# Patient Record
Sex: FEMALE | Race: WHITE | NOT HISPANIC OR LATINO | Employment: OTHER | URBAN - NONMETROPOLITAN AREA
[De-identification: names, ages, dates, MRNs, and addresses within clinical notes are randomized per-mention and may not be internally consistent; named-entity substitution may affect disease eponyms.]

---

## 2023-02-23 PROBLEM — N39.0 RECURRENT UTI: Status: ACTIVE | Noted: 2023-02-23

## 2023-02-23 PROBLEM — M25.512 CHRONIC LEFT SHOULDER PAIN: Status: ACTIVE | Noted: 2023-02-23

## 2023-02-23 PROBLEM — N63.15 BREAST LUMP ON RIGHT SIDE AT 6 O'CLOCK POSITION: Status: ACTIVE | Noted: 2023-02-23

## 2023-02-23 PROBLEM — G89.29 CHRONIC LEFT SHOULDER PAIN: Status: ACTIVE | Noted: 2023-02-23

## 2023-02-23 PROBLEM — M25.612 STIFFNESS OF LEFT SHOULDER, NOT ELSEWHERE CLASSIFIED: Status: ACTIVE | Noted: 2023-02-23

## 2023-02-23 PROBLEM — E55.9 VITAMIN D DEFICIENCY: Status: ACTIVE | Noted: 2023-02-23

## 2023-02-23 PROBLEM — R92.8 ABNORMAL MAMMOGRAM: Status: ACTIVE | Noted: 2023-02-23

## 2023-02-23 PROBLEM — B37.31 CANDIDIASIS, VAGINA: Status: ACTIVE | Noted: 2023-02-23

## 2023-02-23 PROBLEM — R26.89 BALANCE PROBLEM: Status: ACTIVE | Noted: 2023-02-23

## 2023-02-23 PROBLEM — R35.1 NOCTURIA: Status: ACTIVE | Noted: 2023-02-23

## 2023-02-23 PROBLEM — R53.83 FATIGUE: Status: ACTIVE | Noted: 2023-02-23

## 2023-02-23 PROBLEM — S42.202A CLOSED FRACTURE OF LEFT PROXIMAL HUMERUS: Status: ACTIVE | Noted: 2023-02-23

## 2023-02-23 PROBLEM — M81.0 OSTEOPOROSIS: Status: ACTIVE | Noted: 2023-02-23

## 2023-02-23 PROBLEM — B35.6 TINEA CRURIS: Status: ACTIVE | Noted: 2023-02-23

## 2023-02-23 PROBLEM — C50.911 BREAST CANCER, RIGHT BREAST (MULTI): Status: ACTIVE | Noted: 2023-02-23

## 2023-02-23 PROBLEM — B37.2 CANDIDIASIS, CUTANEOUS: Status: ACTIVE | Noted: 2023-02-23

## 2023-02-23 PROBLEM — S49.109G: Status: ACTIVE | Noted: 2023-02-23

## 2023-02-23 PROBLEM — R79.89 LOW VITAMIN B12 LEVEL: Status: ACTIVE | Noted: 2023-02-23

## 2023-02-23 RX ORDER — DOCUSATE SODIUM 100 MG/1
100 CAPSULE, LIQUID FILLED ORAL 2 TIMES DAILY
COMMUNITY
Start: 2021-06-23

## 2023-02-23 RX ORDER — NYSTATIN 100000 [USP'U]/G
POWDER TOPICAL
COMMUNITY
Start: 2021-05-03 | End: 2023-10-02 | Stop reason: WASHOUT

## 2023-02-23 RX ORDER — LANOLIN ALCOHOL/MO/W.PET/CERES
2 CREAM (GRAM) TOPICAL
COMMUNITY
End: 2024-04-15 | Stop reason: SDUPTHER

## 2023-02-23 RX ORDER — ACETAMINOPHEN 325 MG/1
1-2 TABLET ORAL EVERY 4 HOURS PRN
COMMUNITY
Start: 2021-06-23

## 2023-02-23 RX ORDER — CLOTRIMAZOLE AND BETAMETHASONE DIPROPIONATE 10; .64 MG/G; MG/G
1 CREAM TOPICAL 2 TIMES DAILY
COMMUNITY
Start: 2022-11-22

## 2023-02-23 RX ORDER — VITAMIN E (DL,TOCOPHERYL ACET) 180 MG
CAPSULE ORAL
COMMUNITY
Start: 2021-06-23

## 2023-02-23 RX ORDER — ADHESIVE BANDAGE
BANDAGE TOPICAL
COMMUNITY
Start: 2021-06-23

## 2023-02-23 RX ORDER — MULTIVITAMIN
1 TABLET ORAL
COMMUNITY
Start: 2021-06-23

## 2023-02-23 RX ORDER — OXYCODONE AND ACETAMINOPHEN 5; 325 MG/1; MG/1
1 TABLET ORAL EVERY 6 HOURS PRN
COMMUNITY
Start: 2022-02-07 | End: 2023-03-27 | Stop reason: ALTCHOICE

## 2023-02-23 RX ORDER — NYSTATIN 100000 U/G
CREAM TOPICAL
COMMUNITY
Start: 2021-06-23

## 2023-02-23 RX ORDER — CALCIUM CARBONATE/VITAMIN D3 600 MG-20
TABLET,CHEWABLE ORAL
COMMUNITY
Start: 2021-06-23

## 2023-02-23 RX ORDER — ASPIRIN 81 MG/1
1 TABLET ORAL
COMMUNITY
Start: 2021-06-23

## 2023-03-27 ENCOUNTER — OFFICE VISIT (OUTPATIENT)
Dept: PRIMARY CARE | Facility: CLINIC | Age: 83
End: 2023-03-27
Payer: MEDICARE

## 2023-03-27 ENCOUNTER — LAB (OUTPATIENT)
Dept: LAB | Facility: LAB | Age: 83
End: 2023-03-27
Payer: MEDICARE

## 2023-03-27 VITALS
BODY MASS INDEX: 24.74 KG/M2 | OXYGEN SATURATION: 96 % | SYSTOLIC BLOOD PRESSURE: 130 MMHG | DIASTOLIC BLOOD PRESSURE: 80 MMHG | HEART RATE: 101 BPM | HEIGHT: 59 IN | WEIGHT: 122.7 LBS

## 2023-03-27 DIAGNOSIS — R79.89 LOW VITAMIN B12 LEVEL: ICD-10-CM

## 2023-03-27 DIAGNOSIS — R53.83 FATIGUE, UNSPECIFIED TYPE: ICD-10-CM

## 2023-03-27 DIAGNOSIS — Z17.0 MALIGNANT NEOPLASM OF NIPPLE OF RIGHT BREAST IN FEMALE, ESTROGEN RECEPTOR POSITIVE (MULTI): ICD-10-CM

## 2023-03-27 DIAGNOSIS — Z00.00 ROUTINE GENERAL MEDICAL EXAMINATION AT HEALTH CARE FACILITY: Primary | ICD-10-CM

## 2023-03-27 DIAGNOSIS — M81.0 OSTEOPOROSIS WITHOUT CURRENT PATHOLOGICAL FRACTURE, UNSPECIFIED OSTEOPOROSIS TYPE: ICD-10-CM

## 2023-03-27 DIAGNOSIS — C50.011 MALIGNANT NEOPLASM OF NIPPLE OF RIGHT BREAST IN FEMALE, ESTROGEN RECEPTOR POSITIVE (MULTI): ICD-10-CM

## 2023-03-27 LAB
ALANINE AMINOTRANSFERASE (SGPT) (U/L) IN SER/PLAS: 14 U/L (ref 7–45)
ALBUMIN (G/DL) IN SER/PLAS: 4.7 G/DL (ref 3.4–5)
ALKALINE PHOSPHATASE (U/L) IN SER/PLAS: 61 U/L (ref 33–136)
ANION GAP IN SER/PLAS: 14 MMOL/L (ref 10–20)
ASPARTATE AMINOTRANSFERASE (SGOT) (U/L) IN SER/PLAS: 22 U/L (ref 9–39)
BILIRUBIN TOTAL (MG/DL) IN SER/PLAS: 0.5 MG/DL (ref 0–1.2)
CALCIUM (MG/DL) IN SER/PLAS: 10 MG/DL (ref 8.6–10.3)
CARBON DIOXIDE, TOTAL (MMOL/L) IN SER/PLAS: 29 MMOL/L (ref 21–32)
CHLORIDE (MMOL/L) IN SER/PLAS: 101 MMOL/L (ref 98–107)
COBALAMIN (VITAMIN B12) (PG/ML) IN SER/PLAS: 1584 PG/ML (ref 211–911)
CREATININE (MG/DL) IN SER/PLAS: 0.96 MG/DL (ref 0.5–1.05)
ERYTHROCYTE DISTRIBUTION WIDTH (RATIO) BY AUTOMATED COUNT: 13 % (ref 11.5–14.5)
ERYTHROCYTE MEAN CORPUSCULAR HEMOGLOBIN CONCENTRATION (G/DL) BY AUTOMATED: 33.6 G/DL (ref 32–36)
ERYTHROCYTE MEAN CORPUSCULAR VOLUME (FL) BY AUTOMATED COUNT: 100 FL (ref 80–100)
ERYTHROCYTES (10*6/UL) IN BLOOD BY AUTOMATED COUNT: 4.29 X10E12/L (ref 4–5.2)
GFR FEMALE: 58 ML/MIN/1.73M2
GLUCOSE (MG/DL) IN SER/PLAS: 81 MG/DL (ref 74–99)
HEMATOCRIT (%) IN BLOOD BY AUTOMATED COUNT: 42.8 % (ref 36–46)
HEMOGLOBIN (G/DL) IN BLOOD: 14.4 G/DL (ref 12–16)
LEUKOCYTES (10*3/UL) IN BLOOD BY AUTOMATED COUNT: 9.2 X10E9/L (ref 4.4–11.3)
PLATELETS (10*3/UL) IN BLOOD AUTOMATED COUNT: 281 X10E9/L (ref 150–450)
POTASSIUM (MMOL/L) IN SER/PLAS: 4.3 MMOL/L (ref 3.5–5.3)
PROTEIN TOTAL: 7.5 G/DL (ref 6.4–8.2)
SODIUM (MMOL/L) IN SER/PLAS: 140 MMOL/L (ref 136–145)
UREA NITROGEN (MG/DL) IN SER/PLAS: 23 MG/DL (ref 6–23)

## 2023-03-27 PROCEDURE — 85027 COMPLETE CBC AUTOMATED: CPT

## 2023-03-27 PROCEDURE — 36415 COLL VENOUS BLD VENIPUNCTURE: CPT

## 2023-03-27 PROCEDURE — 99213 OFFICE O/P EST LOW 20 MIN: CPT | Performed by: FAMILY MEDICINE

## 2023-03-27 PROCEDURE — 82607 VITAMIN B-12: CPT

## 2023-03-27 PROCEDURE — 1160F RVW MEDS BY RX/DR IN RCRD: CPT | Performed by: FAMILY MEDICINE

## 2023-03-27 PROCEDURE — 1036F TOBACCO NON-USER: CPT | Performed by: FAMILY MEDICINE

## 2023-03-27 PROCEDURE — 1159F MED LIST DOCD IN RCRD: CPT | Performed by: FAMILY MEDICINE

## 2023-03-27 PROCEDURE — 80053 COMPREHEN METABOLIC PANEL: CPT

## 2023-03-27 PROCEDURE — 1157F ADVNC CARE PLAN IN RCRD: CPT | Performed by: FAMILY MEDICINE

## 2023-03-27 PROCEDURE — 1170F FXNL STATUS ASSESSED: CPT | Performed by: FAMILY MEDICINE

## 2023-03-27 PROCEDURE — G0439 PPPS, SUBSEQ VISIT: HCPCS | Performed by: FAMILY MEDICINE

## 2023-03-27 RX ORDER — PUMPKIN SEED EXTRACT/SOY GERM 300 MG
2 CAPSULE ORAL DAILY
COMMUNITY

## 2023-03-27 RX ORDER — POLYETHYLENE GLYCOL 400 AND PROPYLENE GLYCOL 4; 3 MG/ML; MG/ML
1 GEL OPHTHALMIC DAILY
COMMUNITY

## 2023-03-27 RX ORDER — IBUPROFEN 200 MG
600 TABLET ORAL EVERY 8 HOURS PRN
COMMUNITY

## 2023-03-27 RX ORDER — OLOPATADINE HYDROCHLORIDE 1 MG/ML
1 SOLUTION/ DROPS OPHTHALMIC DAILY
COMMUNITY

## 2023-03-27 ASSESSMENT — PATIENT HEALTH QUESTIONNAIRE - PHQ9
1. LITTLE INTEREST OR PLEASURE IN DOING THINGS: NOT AT ALL
SUM OF ALL RESPONSES TO PHQ9 QUESTIONS 1 AND 2: 0
2. FEELING DOWN, DEPRESSED OR HOPELESS: NOT AT ALL

## 2023-03-27 ASSESSMENT — ENCOUNTER SYMPTOMS
APPETITE CHANGE: 0
PALPITATIONS: 0
DIARRHEA: 0
NAUSEA: 0
VOMITING: 0
WHEEZING: 0
ABDOMINAL DISTENTION: 0
UNEXPECTED WEIGHT CHANGE: 0
NUMBNESS: 0
RHINORRHEA: 0
LOSS OF SENSATION IN FEET: 0
ADENOPATHY: 0
COUGH: 0
DIZZINESS: 0
FATIGUE: 0
TROUBLE SWALLOWING: 0
LIGHT-HEADEDNESS: 0
OCCASIONAL FEELINGS OF UNSTEADINESS: 1
DEPRESSION: 0
HEADACHES: 0
SHORTNESS OF BREATH: 0
CONSTIPATION: 0
DIFFICULTY URINATING: 0
ACTIVITY CHANGE: 0
ABDOMINAL PAIN: 0
ARTHRALGIAS: 0

## 2023-03-27 ASSESSMENT — ACTIVITIES OF DAILY LIVING (ADL)
DOING_HOUSEWORK: NEEDS ASSISTANCE
DRESSING: NEEDS ASSISTANCE
GROCERY_SHOPPING: NEEDS ASSISTANCE
MANAGING_FINANCES: NEEDS ASSISTANCE
BATHING: NEEDS ASSISTANCE
TAKING_MEDICATION: NEEDS ASSISTANCE

## 2023-03-27 NOTE — ASSESSMENT & PLAN NOTE
We will check blood testing today, patient is a resident of an assisted living facility, uses a wheeled walker to get around, needs some help with activities of daily living but otherwise seems to be doing very well and adapting well

## 2023-03-27 NOTE — PROGRESS NOTES
"Subjective   Reason for Visit: Fide Dodson is an 82 y.o. female here for a Medicare Wellness visit.     Past Medical, Surgical, and Family History reviewed and updated in chart.    Reviewed all medications by prescribing practitioner or clinical pharmacist (such as prescriptions, OTCs, herbal therapies and supplements) and documented in the medical record.    HPI  Seen surgeon in [place] for breast cancer a year ago, sees oncology, no medicines  No regular Rx  Medicines  Having some dental issues, has appointment in May  Uses occ APAP if needed for pain  Uses a walker to get around, has had some falls at assisted living      Patient Care Team:  Maxx Motta MD as PCP - General (Family Medicine)  René Mckay MD as PCP - O Medicare Advantage PCP     Review of Systems   Constitutional:  Negative for activity change, appetite change, fatigue and unexpected weight change.   HENT:  Negative for ear pain, nosebleeds, rhinorrhea, sneezing and trouble swallowing.    Respiratory:  Negative for cough, shortness of breath and wheezing.    Cardiovascular:  Negative for chest pain, palpitations and leg swelling.   Gastrointestinal:  Negative for abdominal distention, abdominal pain, constipation, diarrhea, nausea and vomiting.   Genitourinary:  Negative for difficulty urinating.   Musculoskeletal:  Negative for arthralgias.   Skin:  Negative for rash.   Neurological:  Negative for dizziness, light-headedness, numbness and headaches.   Hematological:  Negative for adenopathy.   Psychiatric/Behavioral:  Negative for behavioral problems.    All other systems reviewed and are negative.      Objective   Vitals:  /80   Pulse 101   Ht 1.499 m (4' 11\")   Wt 55.7 kg (122 lb 11.2 oz)   SpO2 96%   BMI 24.78 kg/m²       Physical Exam  Vitals and nursing note reviewed.   Constitutional:       Appearance: Normal appearance.   Cardiovascular:      Rate and Rhythm: Normal rate and regular rhythm.      Pulses: " Normal pulses.      Heart sounds: Normal heart sounds.   Pulmonary:      Effort: Pulmonary effort is normal.      Breath sounds: Normal breath sounds.   Neurological:      Mental Status: She is alert.   Psychiatric:         Mood and Affect: Mood normal.         Behavior: Behavior normal.         Assessment/Plan   Problem List Items Addressed This Visit          Musculoskeletal    Osteoporosis       Hematologic    Low vitamin B12 level    Current Assessment & Plan     Check blood testing today.            Other    Breast cancer, right breast (CMS/HCC)    Current Assessment & Plan     Patient follows with oncology at [facility], is currently on no medications, had surgery over a year ago.         Fatigue    Current Assessment & Plan     We will check blood testing today, patient is a resident of an assisted living facility, uses a wheeled walker to get around, needs some help with activities of daily living but otherwise seems to be doing very well and adapting well          Other Visit Diagnoses       Routine general medical examination at health care facility    -  Primary

## 2023-03-27 NOTE — ASSESSMENT & PLAN NOTE
Patient follows with oncology at [facility], is currently on no medications, had surgery over a year ago.

## 2023-04-12 ENCOUNTER — APPOINTMENT (OUTPATIENT)
Dept: PRIMARY CARE | Facility: CLINIC | Age: 83
End: 2023-04-12
Payer: MEDICARE

## 2023-10-02 ENCOUNTER — LAB (OUTPATIENT)
Dept: LAB | Facility: LAB | Age: 83
End: 2023-10-02
Payer: MEDICARE

## 2023-10-02 ENCOUNTER — OFFICE VISIT (OUTPATIENT)
Dept: PRIMARY CARE | Facility: CLINIC | Age: 83
End: 2023-10-02
Payer: MEDICARE

## 2023-10-02 VITALS
HEART RATE: 67 BPM | SYSTOLIC BLOOD PRESSURE: 100 MMHG | DIASTOLIC BLOOD PRESSURE: 62 MMHG | WEIGHT: 117.8 LBS | BODY MASS INDEX: 23.75 KG/M2 | OXYGEN SATURATION: 95 % | HEIGHT: 59 IN

## 2023-10-02 DIAGNOSIS — E55.9 VITAMIN D DEFICIENCY: ICD-10-CM

## 2023-10-02 DIAGNOSIS — Z17.0 MALIGNANT NEOPLASM OF NIPPLE OF RIGHT BREAST IN FEMALE, ESTROGEN RECEPTOR POSITIVE (MULTI): ICD-10-CM

## 2023-10-02 DIAGNOSIS — R79.89 LOW VITAMIN B12 LEVEL: ICD-10-CM

## 2023-10-02 DIAGNOSIS — R26.89 BALANCE PROBLEM: ICD-10-CM

## 2023-10-02 DIAGNOSIS — R53.83 FATIGUE, UNSPECIFIED TYPE: ICD-10-CM

## 2023-10-02 DIAGNOSIS — M81.0 OSTEOPOROSIS WITHOUT CURRENT PATHOLOGICAL FRACTURE, UNSPECIFIED OSTEOPOROSIS TYPE: ICD-10-CM

## 2023-10-02 DIAGNOSIS — Z78.0 MENOPAUSE: ICD-10-CM

## 2023-10-02 DIAGNOSIS — R53.83 FATIGUE, UNSPECIFIED TYPE: Primary | ICD-10-CM

## 2023-10-02 DIAGNOSIS — C50.011 MALIGNANT NEOPLASM OF NIPPLE OF RIGHT BREAST IN FEMALE, ESTROGEN RECEPTOR POSITIVE (MULTI): ICD-10-CM

## 2023-10-02 PROBLEM — B37.31 CANDIDIASIS, VAGINA: Status: RESOLVED | Noted: 2023-02-23 | Resolved: 2023-10-02

## 2023-10-02 PROBLEM — B37.2 CANDIDIASIS, CUTANEOUS: Status: RESOLVED | Noted: 2023-02-23 | Resolved: 2023-10-02

## 2023-10-02 PROBLEM — R35.1 NOCTURIA: Status: RESOLVED | Noted: 2023-02-23 | Resolved: 2023-10-02

## 2023-10-02 PROBLEM — B35.6 TINEA CRURIS: Status: RESOLVED | Noted: 2023-02-23 | Resolved: 2023-10-02

## 2023-10-02 PROBLEM — N63.15 BREAST LUMP ON RIGHT SIDE AT 6 O'CLOCK POSITION: Status: RESOLVED | Noted: 2023-02-23 | Resolved: 2023-10-02

## 2023-10-02 PROBLEM — R92.8 ABNORMAL MAMMOGRAM: Status: RESOLVED | Noted: 2023-02-23 | Resolved: 2023-10-02

## 2023-10-02 LAB
25(OH)D3 SERPL-MCNC: 63 NG/ML (ref 30–100)
ALBUMIN SERPL BCP-MCNC: 4.2 G/DL (ref 3.4–5)
ALP SERPL-CCNC: 75 U/L (ref 33–136)
ALT SERPL W P-5'-P-CCNC: 14 U/L (ref 7–45)
ANION GAP SERPL CALC-SCNC: 12 MMOL/L (ref 10–20)
AST SERPL W P-5'-P-CCNC: 17 U/L (ref 9–39)
BASOPHILS # BLD AUTO: 0.06 X10*3/UL (ref 0–0.1)
BASOPHILS NFR BLD AUTO: 0.7 %
BILIRUB SERPL-MCNC: 0.5 MG/DL (ref 0–1.2)
BUN SERPL-MCNC: 10 MG/DL (ref 6–23)
CALCIUM SERPL-MCNC: 9.9 MG/DL (ref 8.6–10.3)
CHLORIDE SERPL-SCNC: 102 MMOL/L (ref 98–107)
CO2 SERPL-SCNC: 33 MMOL/L (ref 21–32)
CREAT SERPL-MCNC: 0.88 MG/DL (ref 0.5–1.05)
EOSINOPHIL # BLD AUTO: 0.48 X10*3/UL (ref 0–0.4)
EOSINOPHIL NFR BLD AUTO: 5.8 %
ERYTHROCYTE [DISTWIDTH] IN BLOOD BY AUTOMATED COUNT: 12.8 % (ref 11.5–14.5)
GFR SERPL CREATININE-BSD FRML MDRD: 65 ML/MIN/1.73M*2
GLUCOSE SERPL-MCNC: 110 MG/DL (ref 74–99)
HCT VFR BLD AUTO: 41.1 % (ref 36–46)
HGB BLD-MCNC: 13.3 G/DL (ref 12–16)
IMM GRANULOCYTES # BLD AUTO: 0.05 X10*3/UL (ref 0–0.5)
IMM GRANULOCYTES NFR BLD AUTO: 0.6 % (ref 0–0.9)
LYMPHOCYTES # BLD AUTO: 0.96 X10*3/UL (ref 0.8–3)
LYMPHOCYTES NFR BLD AUTO: 11.6 %
MCH RBC QN AUTO: 33.1 PG (ref 26–34)
MCHC RBC AUTO-ENTMCNC: 32.4 G/DL (ref 32–36)
MCV RBC AUTO: 102 FL (ref 80–100)
MONOCYTES # BLD AUTO: 0.74 X10*3/UL (ref 0.05–0.8)
MONOCYTES NFR BLD AUTO: 8.9 %
NEUTROPHILS # BLD AUTO: 5.98 X10*3/UL (ref 1.6–5.5)
NEUTROPHILS NFR BLD AUTO: 72.4 %
NRBC BLD-RTO: 0 /100 WBCS (ref 0–0)
PLATELET # BLD AUTO: 334 X10*3/UL (ref 150–450)
PMV BLD AUTO: 9.3 FL (ref 7.5–11.5)
POTASSIUM SERPL-SCNC: 3.7 MMOL/L (ref 3.5–5.3)
PROT SERPL-MCNC: 6.8 G/DL (ref 6.4–8.2)
RBC # BLD AUTO: 4.02 X10*6/UL (ref 4–5.2)
SODIUM SERPL-SCNC: 143 MMOL/L (ref 136–145)
TSH SERPL-ACNC: 0.79 MIU/L (ref 0.44–3.98)
VIT B12 SERPL-MCNC: 2432 PG/ML (ref 211–911)
WBC # BLD AUTO: 8.3 X10*3/UL (ref 4.4–11.3)

## 2023-10-02 PROCEDURE — 1160F RVW MEDS BY RX/DR IN RCRD: CPT | Performed by: FAMILY MEDICINE

## 2023-10-02 PROCEDURE — 36415 COLL VENOUS BLD VENIPUNCTURE: CPT

## 2023-10-02 PROCEDURE — 99214 OFFICE O/P EST MOD 30 MIN: CPT | Performed by: FAMILY MEDICINE

## 2023-10-02 PROCEDURE — 1159F MED LIST DOCD IN RCRD: CPT | Performed by: FAMILY MEDICINE

## 2023-10-02 PROCEDURE — 1036F TOBACCO NON-USER: CPT | Performed by: FAMILY MEDICINE

## 2023-10-02 RX ORDER — CHLORHEXIDINE GLUCONATE ORAL RINSE 1.2 MG/ML
15 SOLUTION DENTAL 2 TIMES DAILY
COMMUNITY

## 2023-10-02 RX ORDER — 1.1% SODIUM FLUORIDE PRESCRIPTION DENTAL CREAM 5 MG/G
CREAM DENTAL DAILY
COMMUNITY
Start: 2023-09-11

## 2023-10-02 ASSESSMENT — ENCOUNTER SYMPTOMS
SLEEP DISTURBANCE: 0
PALPITATIONS: 0
FATIGUE: 1
CONSTIPATION: 0
RHINORRHEA: 0
BACK PAIN: 1
DYSPHORIC MOOD: 0
COUGH: 0
LIGHT-HEADEDNESS: 0
SHORTNESS OF BREATH: 0
VOMITING: 0
ABDOMINAL DISTENTION: 0
NAUSEA: 0
ADENOPATHY: 0
NUMBNESS: 0
APPETITE CHANGE: 0
NERVOUS/ANXIOUS: 0
DIARRHEA: 0
ARTHRALGIAS: 0
TROUBLE SWALLOWING: 0
UNEXPECTED WEIGHT CHANGE: 0
WHEEZING: 0
HEADACHES: 0
DIFFICULTY URINATING: 0
ACTIVITY CHANGE: 0
ABDOMINAL PAIN: 0
DECREASED CONCENTRATION: 0
DIZZINESS: 0

## 2023-10-02 NOTE — PROGRESS NOTES
"Subjective   Patient ID: Fide Dodson is a 83 y.o. female who presents for 6 MO F/U.    HPI   No headache, chest pain, shortness of breath, dizziness, lightheadedness, or edema  Seen surgery in September and had MMG done  Treated for recent UTI, nitrofurantoin upset stomach, some appetite issues, had some diarrhea  Some PT due to weakness, using a walker to get around, no falls in the past 6 months      Review of Systems   Constitutional:  Positive for fatigue. Negative for activity change, appetite change and unexpected weight change.   HENT:  Negative for ear pain, nosebleeds, rhinorrhea, sneezing and trouble swallowing.    Respiratory:  Negative for cough, shortness of breath and wheezing.    Cardiovascular:  Negative for chest pain, palpitations and leg swelling.   Gastrointestinal:  Negative for abdominal distention, abdominal pain, constipation, diarrhea, nausea and vomiting.   Genitourinary:  Negative for difficulty urinating.   Musculoskeletal:  Positive for back pain and gait problem. Negative for arthralgias.   Skin:  Negative for rash.   Neurological:  Negative for dizziness, light-headedness, numbness and headaches.   Hematological:  Negative for adenopathy.   Psychiatric/Behavioral:  Negative for behavioral problems, decreased concentration, dysphoric mood and sleep disturbance. The patient is not nervous/anxious.    All other systems reviewed and are negative.      Objective   /62   Pulse 67   Ht 1.499 m (4' 11\")   Wt 53.4 kg (117 lb 12.8 oz)   SpO2 95%   BMI 23.79 kg/m²     Physical Exam  Vitals and nursing note reviewed.   Constitutional:       Appearance: Normal appearance.   HENT:      Head: Normocephalic and atraumatic.      Right Ear: Tympanic membrane, ear canal and external ear normal.      Left Ear: Tympanic membrane, ear canal and external ear normal.      Nose: Nose normal.      Mouth/Throat:      Mouth: Mucous membranes are moist.      Pharynx: Oropharynx is clear. "   Cardiovascular:      Rate and Rhythm: Normal rate and regular rhythm.      Pulses: Normal pulses.      Heart sounds: Normal heart sounds.   Pulmonary:      Effort: Pulmonary effort is normal.      Breath sounds: Normal breath sounds.   Musculoskeletal:      Cervical back: Normal range of motion and neck supple.   Neurological:      Mental Status: She is alert.   Psychiatric:         Mood and Affect: Mood normal.         Behavior: Behavior normal.         Assessment/Plan   Problem List Items Addressed This Visit             ICD-10-CM    Balance problem R26.89     Uses a walker, tolerating physical therapy at facility.         Relevant Orders    Follow Up In Primary Care - Established    Breast cancer, right breast (CMS/[facility]) C50.911     Recently seen in surgery, had mammogram done that was negative, patient had deferred hormone deprivation therapy, no change with current treatment plan.         Relevant Orders    Follow Up In Primary Care - Established    Fatigue - Primary R53.83    Relevant Orders    CBC and Auto Differential    Follow Up In Primary Care - Established    Comprehensive Metabolic Panel    Vitamin B12    Thyroid Stimulating Hormone    Low vitamin B12 level R79.89     Labs today, continue with oral supplementation.         Relevant Orders    CBC and Auto Differential    Follow Up In Primary Care - Established    Osteoporosis M81.0     Continue calcium and vitamin D, check vitamin D level today, check bone density test given recent fracture, last bone density test was over 2 years ago.         Relevant Orders    Follow Up In Primary Care - Established    Vitamin D 25-Hydroxy,Total (for eval of Vitamin D levels)    XR DEXA bone density    Vitamin D deficiency E55.9     Continue with supplementation, check levels today.         Relevant Orders    Follow Up In Primary Care - Established    Vitamin D 25-Hydroxy,Total (for eval of Vitamin D levels)    XR DEXA bone density     Other Visit Diagnoses          Codes    Menopause     Z78.0    Relevant Orders    XR DEXA bone density             Patient was identified as a fall risk. Risk prevention instructions provided.

## 2023-10-02 NOTE — ASSESSMENT & PLAN NOTE
Recently seen in surgery, had mammogram done that was negative, patient had deferred hormone deprivation therapy, no change with current treatment plan.

## 2023-10-02 NOTE — ASSESSMENT & PLAN NOTE
Continue calcium and vitamin D, check vitamin D level today, check bone density test given recent fracture, last bone density test was over 2 years ago.

## 2023-10-10 ENCOUNTER — ANCILLARY PROCEDURE (OUTPATIENT)
Dept: RADIOLOGY | Facility: CLINIC | Age: 83
End: 2023-10-10
Payer: MEDICARE

## 2023-10-10 DIAGNOSIS — Z78.0 MENOPAUSE: ICD-10-CM

## 2023-10-10 DIAGNOSIS — E55.9 VITAMIN D DEFICIENCY: ICD-10-CM

## 2023-10-10 DIAGNOSIS — M81.0 OSTEOPOROSIS WITHOUT CURRENT PATHOLOGICAL FRACTURE, UNSPECIFIED OSTEOPOROSIS TYPE: ICD-10-CM

## 2023-10-10 PROCEDURE — 77080 DXA BONE DENSITY AXIAL: CPT

## 2023-10-10 PROCEDURE — 77080 DXA BONE DENSITY AXIAL: CPT | Performed by: STUDENT IN AN ORGANIZED HEALTH CARE EDUCATION/TRAINING PROGRAM

## 2023-10-11 ENCOUNTER — TELEPHONE (OUTPATIENT)
Dept: PRIMARY CARE | Facility: CLINIC | Age: 83
End: 2023-10-11
Payer: MEDICARE

## 2023-10-11 NOTE — TELEPHONE ENCOUNTER
Maxx Motta MD  [id number] [id number] Clinical Support Staff  Please let the patient know that her bone density testing does show some loss of bone density compared to prior bone density testing.  Given her recent fracture she may want to consider taking medication in addition to her calcium and vitamin D to try to strengthen her bones.  If she is interested in this, please schedule an outpatient appointment so that we can discuss her options for her.    GRANDDAUGHTER,([place] HIPAA) NOTIFIED. APPOINTMENT SCHEDULED

## 2023-11-07 ENCOUNTER — OFFICE VISIT (OUTPATIENT)
Dept: PRIMARY CARE | Facility: CLINIC | Age: 83
End: 2023-11-07
Payer: MEDICARE

## 2023-11-07 VITALS
HEIGHT: 59 IN | OXYGEN SATURATION: 96 % | BODY MASS INDEX: 23.24 KG/M2 | WEIGHT: 115.3 LBS | HEART RATE: 109 BPM | SYSTOLIC BLOOD PRESSURE: 110 MMHG | DIASTOLIC BLOOD PRESSURE: 80 MMHG

## 2023-11-07 DIAGNOSIS — M81.0 AGE RELATED OSTEOPOROSIS, UNSPECIFIED PATHOLOGICAL FRACTURE PRESENCE: Primary | ICD-10-CM

## 2023-11-07 PROCEDURE — 99213 OFFICE O/P EST LOW 20 MIN: CPT | Performed by: FAMILY MEDICINE

## 2023-11-07 PROCEDURE — 1036F TOBACCO NON-USER: CPT | Performed by: FAMILY MEDICINE

## 2023-11-07 PROCEDURE — 1159F MED LIST DOCD IN RCRD: CPT | Performed by: FAMILY MEDICINE

## 2023-11-07 PROCEDURE — 1160F RVW MEDS BY RX/DR IN RCRD: CPT | Performed by: FAMILY MEDICINE

## 2023-11-07 RX ORDER — ALENDRONATE SODIUM 70 MG/1
70 TABLET ORAL
Qty: 12 TABLET | Refills: 3 | Status: SHIPPED | OUTPATIENT
Start: 2023-11-07 | End: 2024-11-06

## 2023-11-07 ASSESSMENT — ENCOUNTER SYMPTOMS
BACK PAIN: 0
DIARRHEA: 0
ARTHRALGIAS: 0
PALPITATIONS: 0
CHEST TIGHTNESS: 0
SHORTNESS OF BREATH: 0
FATIGUE: 0
COUGH: 0
VOMITING: 0
ACTIVITY CHANGE: 0
NAUSEA: 0
CONSTIPATION: 0
APPETITE CHANGE: 0
ABDOMINAL PAIN: 0

## 2023-11-07 NOTE — PROGRESS NOTES
Subjective   Patient ID: Fide Dodson is a 83 y.o. female who presents for Osteoporosis (Discuss treatment).    HPI   Some before for her bones, does not recall what it was but it was several years ago.  Recent bone density test suggest some progression of osteoporosis, patient also has had a hip fracture in the past 6 months.    Review of Systems   Constitutional:  Negative for activity change, appetite change and fatigue.   Respiratory:  Negative for cough, chest tightness and shortness of breath.    Cardiovascular:  Negative for chest pain, palpitations and leg swelling.   Gastrointestinal:  Negative for abdominal pain, constipation, diarrhea, nausea and vomiting.   Musculoskeletal:  Positive for gait problem. Negative for arthralgias and back pain.       Current Outpatient Medications:     acetaminophen (Tylenol) 325 mg tablet, Take 1-2 tablets (325-650 mg) by mouth every 4 hours if needed., Disp: , Rfl:     aspirin 81 mg EC tablet, Take 1 tablet (81 mg) by mouth once every day., Disp: , Rfl:     calcium carbonate-vitamin D3 (Caltrate 600 plus D) 600 mg-20 mcg (800 unit) tablet,chewable, Caltrate 600+D Plus Minerals 600-800 MG-UNIT Oral Tablet Chewable  Refills: 0     René Mckay MD   Start : 23-Jun-2021  Active, Disp: , Rfl:     chlorhexidine (Peridex) 0.12 % solution, Use 15 mL in the mouth or throat twice a day., Disp: , Rfl:     clotrimazole-betamethasone (Lotrisone) cream, Apply 1 Application topically twice a day., Disp: , Rfl:     cranberry fruit concentrate (Azo Cranberry) 250 mg tablet,chewable, Chew 2 tablets once daily., Disp: , Rfl:     docusate sodium (Colace) 100 mg capsule, Take 1 capsule (100 mg) by mouth twice a day., Disp: , Rfl:     ibuprofen 200 mg tablet, Take 3 tablets (600 mg) by mouth every 8 hours if needed for headaches., Disp: , Rfl:     magnesium hydroxide (Milk of Magnesia) 400 mg/5 mL suspension, Use as directed., Disp: , Rfl:     multivit-min-folic acid-biotin (Hair,Skin and  "Nails,FA-biotin,) 133.3 mcg- 1,666.7 mcg capsule, Take as directed, Disp: , Rfl:     multivitamin tablet, Take 1 tablet by mouth once every day., Disp: , Rfl:     nystatin (Mycostatin) cream, Nystatin 805925 UNIT/GM External Cream  Quantity: 15  Refills: 0      Start : 23-Jun-2021  Active, Disp: , Rfl:     olopatadine (Patanol) 0.1 % ophthalmic solution, Administer 1 drop into both eyes once daily., Disp: , Rfl:     peg 400-propylene glycol (Systane GeL) 0.4-0.3 % drops,gel, Administer 1 drop into affected eye(s) once daily., Disp: , Rfl:     SF 5000 Plus 1.1 % dental cream, Apply to teeth once daily., Disp: , Rfl:     talc (DENY'S BABY POWDER TOP), Apply 1 Application topically 2 times a day as needed (Rash)., Disp: , Rfl:     cyanocobalamin (Vitamin B-12) 1,000 mcg tablet, Take 2 tablets (2,000 mcg) by mouth once every day., Disp: , Rfl:       Objective   /80   Pulse 109   Ht 1.499 m (4' 11\")   Wt 52.3 kg (115 lb 4.8 oz)   SpO2 96%   BMI 23.29 kg/m²     Physical Exam  Vitals and nursing note reviewed.   Constitutional:       Appearance: Normal appearance. She is normal weight.   Neurological:      Mental Status: She is alert.       COMPARISON:  10/19/2016 and 11/02/2018      ACCESSION NUMBER(S):  [id number]      ORDERING CLINICIAN:  HERRERA FINNEY      TECHNIQUE:  DEXA BONE DENSITY      FINDINGS:  SPINE L1-L4  Bone Mineral Density: 1.007  T-Score -1.5  Z-Score 0.4  Bone Mineral Density change vs baseline:  Not reported  Bone Mineral Density change vs previous: -5.4%      LEFT FEMUR -TOTAL  Bone Mineral Density: 0.800  T-Score -1.6   Z-Score  0.6  Bone Mineral Density change vs baseline: Not reported  Bone Mineral Density change vs previous: -0.4%      LEFT FEMUR -NECK  Bone Mineral Density: 0.677  T-Score -2.6  Z-Score -0.3    Assessment/Plan   Problem List Items Addressed This Visit             ICD-10-CM    Osteoporosis - Primary M81.0     Patient's bone density test does show some progression " of osteoporosis especially in the spine, post hip fracture from a fall in the past 6 months.  We will start alendronate once a week, continue with calcium vitamin D, vitamin D levels and calcium and renal functions were all normal on recent blood test.

## 2023-11-07 NOTE — ASSESSMENT & PLAN NOTE
Patient's bone density test does show some progression of osteoporosis especially in the spine, post hip fracture from a fall in the past 6 months.  We will start alendronate once a week, continue with calcium vitamin D, vitamin D levels and calcium and renal functions were all normal on recent blood test.

## 2023-11-14 ENCOUNTER — LAB REQUISITION (OUTPATIENT)
Dept: LAB | Facility: HOSPITAL | Age: 83
End: 2023-11-14
Payer: MEDICARE

## 2023-11-14 DIAGNOSIS — R30.0 DYSURIA: ICD-10-CM

## 2023-11-14 LAB
APPEARANCE UR: ABNORMAL
BACTERIA #/AREA URNS AUTO: ABNORMAL /HPF
BILIRUB UR STRIP.AUTO-MCNC: NEGATIVE MG/DL
CAOX CRY #/AREA UR COMP ASSIST: ABNORMAL /HPF
COLOR UR: ABNORMAL
GLUCOSE UR STRIP.AUTO-MCNC: NEGATIVE MG/DL
HYALINE CASTS #/AREA URNS AUTO: ABNORMAL /LPF
KETONES UR STRIP.AUTO-MCNC: NEGATIVE MG/DL
LEUKOCYTE ESTERASE UR QL STRIP.AUTO: ABNORMAL
MUCOUS THREADS #/AREA URNS AUTO: ABNORMAL /LPF
NITRITE UR QL STRIP.AUTO: NEGATIVE
PH UR STRIP.AUTO: 5 [PH]
PROT UR STRIP.AUTO-MCNC: ABNORMAL MG/DL
RBC # UR STRIP.AUTO: NEGATIVE /UL
RBC #/AREA URNS AUTO: ABNORMAL /HPF
SP GR UR STRIP.AUTO: 1.02
SQUAMOUS #/AREA URNS AUTO: ABNORMAL /HPF
UROBILINOGEN UR STRIP.AUTO-MCNC: <2 MG/DL
WBC #/AREA URNS AUTO: >50 /HPF
WBC CLUMPS #/AREA URNS AUTO: ABNORMAL /HPF

## 2023-11-14 PROCEDURE — 81001 URINALYSIS AUTO W/SCOPE: CPT

## 2023-11-14 PROCEDURE — 87186 SC STD MICRODIL/AGAR DIL: CPT

## 2023-11-14 PROCEDURE — 87086 URINE CULTURE/COLONY COUNT: CPT

## 2023-11-16 LAB — BACTERIA UR CULT: ABNORMAL

## 2024-04-02 ENCOUNTER — LAB REQUISITION (OUTPATIENT)
Dept: LAB | Facility: HOSPITAL | Age: 84
End: 2024-04-02
Payer: MEDICARE

## 2024-04-02 DIAGNOSIS — D51.9 VITAMIN B12 DEFICIENCY ANEMIA, UNSPECIFIED: ICD-10-CM

## 2024-04-02 DIAGNOSIS — E55.9 VITAMIN D DEFICIENCY, UNSPECIFIED: ICD-10-CM

## 2024-04-02 DIAGNOSIS — R53.83 OTHER FATIGUE: ICD-10-CM

## 2024-04-02 LAB
25(OH)D3 SERPL-MCNC: 55 NG/ML (ref 30–100)
ALBUMIN SERPL BCP-MCNC: 4.4 G/DL (ref 3.4–5)
ALP SERPL-CCNC: 55 U/L (ref 33–136)
ALT SERPL W P-5'-P-CCNC: 10 U/L (ref 7–45)
ANION GAP SERPL CALC-SCNC: 14 MMOL/L (ref 10–20)
AST SERPL W P-5'-P-CCNC: 21 U/L (ref 9–39)
BILIRUB SERPL-MCNC: 0.6 MG/DL (ref 0–1.2)
BUN SERPL-MCNC: 17 MG/DL (ref 6–23)
CALCIUM SERPL-MCNC: 9.1 MG/DL (ref 8.6–10.3)
CHLORIDE SERPL-SCNC: 104 MMOL/L (ref 98–107)
CO2 SERPL-SCNC: 27 MMOL/L (ref 21–32)
CREAT SERPL-MCNC: 0.84 MG/DL (ref 0.5–1.05)
EGFRCR SERPLBLD CKD-EPI 2021: 69 ML/MIN/1.73M*2
ERYTHROCYTE [DISTWIDTH] IN BLOOD BY AUTOMATED COUNT: 12.8 % (ref 11.5–14.5)
GLUCOSE SERPL-MCNC: 82 MG/DL (ref 74–99)
HCT VFR BLD AUTO: 42.5 % (ref 36–46)
HGB BLD-MCNC: 14 G/DL (ref 12–16)
MCH RBC QN AUTO: 34 PG (ref 26–34)
MCHC RBC AUTO-ENTMCNC: 32.9 G/DL (ref 32–36)
MCV RBC AUTO: 103 FL (ref 80–100)
NRBC BLD-RTO: 0 /100 WBCS (ref 0–0)
PLATELET # BLD AUTO: 213 X10*3/UL (ref 150–450)
POTASSIUM SERPL-SCNC: 4.1 MMOL/L (ref 3.5–5.3)
PROT SERPL-MCNC: 6.6 G/DL (ref 6.4–8.2)
RBC # BLD AUTO: 4.12 X10*6/UL (ref 4–5.2)
SODIUM SERPL-SCNC: 141 MMOL/L (ref 136–145)
TSH SERPL-ACNC: 0.99 MIU/L (ref 0.44–3.98)
VIT B12 SERPL-MCNC: 342 PG/ML (ref 211–911)
WBC # BLD AUTO: 7.3 X10*3/UL (ref 4.4–11.3)

## 2024-04-02 PROCEDURE — 80053 COMPREHEN METABOLIC PANEL: CPT

## 2024-04-02 PROCEDURE — 82607 VITAMIN B-12: CPT

## 2024-04-02 PROCEDURE — 82306 VITAMIN D 25 HYDROXY: CPT

## 2024-04-02 PROCEDURE — 85027 COMPLETE CBC AUTOMATED: CPT

## 2024-04-02 PROCEDURE — 84443 ASSAY THYROID STIM HORMONE: CPT

## 2024-04-04 ENCOUNTER — APPOINTMENT (OUTPATIENT)
Dept: PRIMARY CARE | Facility: CLINIC | Age: 84
End: 2024-04-04
Payer: MEDICARE

## 2024-04-15 ENCOUNTER — OFFICE VISIT (OUTPATIENT)
Dept: PRIMARY CARE | Facility: CLINIC | Age: 84
End: 2024-04-15
Payer: MEDICARE

## 2024-04-15 VITALS
BODY MASS INDEX: 21.03 KG/M2 | OXYGEN SATURATION: 95 % | WEIGHT: 104.3 LBS | HEIGHT: 59 IN | SYSTOLIC BLOOD PRESSURE: 152 MMHG | HEART RATE: 94 BPM | DIASTOLIC BLOOD PRESSURE: 70 MMHG

## 2024-04-15 DIAGNOSIS — R10.13 DYSPEPSIA: ICD-10-CM

## 2024-04-15 DIAGNOSIS — Z00.00 ROUTINE GENERAL MEDICAL EXAMINATION AT HEALTH CARE FACILITY: Primary | ICD-10-CM

## 2024-04-15 DIAGNOSIS — R53.83 FATIGUE, UNSPECIFIED TYPE: ICD-10-CM

## 2024-04-15 DIAGNOSIS — M81.0 OSTEOPOROSIS WITHOUT CURRENT PATHOLOGICAL FRACTURE, UNSPECIFIED OSTEOPOROSIS TYPE: ICD-10-CM

## 2024-04-15 DIAGNOSIS — R26.89 BALANCE PROBLEM: ICD-10-CM

## 2024-04-15 DIAGNOSIS — R79.89 LOW VITAMIN B12 LEVEL: ICD-10-CM

## 2024-04-15 DIAGNOSIS — E55.9 VITAMIN D DEFICIENCY: ICD-10-CM

## 2024-04-15 DIAGNOSIS — R63.4 WEIGHT LOSS: ICD-10-CM

## 2024-04-15 DIAGNOSIS — Z17.0 MALIGNANT NEOPLASM OF NIPPLE OF RIGHT BREAST IN FEMALE, ESTROGEN RECEPTOR POSITIVE (MULTI): ICD-10-CM

## 2024-04-15 DIAGNOSIS — C50.011 MALIGNANT NEOPLASM OF NIPPLE OF RIGHT BREAST IN FEMALE, ESTROGEN RECEPTOR POSITIVE (MULTI): ICD-10-CM

## 2024-04-15 PROCEDURE — G0439 PPPS, SUBSEQ VISIT: HCPCS | Performed by: FAMILY MEDICINE

## 2024-04-15 PROCEDURE — 1157F ADVNC CARE PLAN IN RCRD: CPT | Performed by: FAMILY MEDICINE

## 2024-04-15 PROCEDURE — 1159F MED LIST DOCD IN RCRD: CPT | Performed by: FAMILY MEDICINE

## 2024-04-15 PROCEDURE — 1170F FXNL STATUS ASSESSED: CPT | Performed by: FAMILY MEDICINE

## 2024-04-15 PROCEDURE — 1036F TOBACCO NON-USER: CPT | Performed by: FAMILY MEDICINE

## 2024-04-15 PROCEDURE — 1160F RVW MEDS BY RX/DR IN RCRD: CPT | Performed by: FAMILY MEDICINE

## 2024-04-15 PROCEDURE — 99214 OFFICE O/P EST MOD 30 MIN: CPT | Performed by: FAMILY MEDICINE

## 2024-04-15 RX ORDER — LANOLIN ALCOHOL/MO/W.PET/CERES
1000 CREAM (GRAM) TOPICAL
Qty: 90 TABLET | Refills: 3 | Status: SHIPPED | OUTPATIENT
Start: 2024-04-15 | End: 2025-04-15

## 2024-04-15 RX ORDER — FAMOTIDINE 20 MG/1
20 TABLET, FILM COATED ORAL 2 TIMES DAILY
Qty: 180 TABLET | Refills: 3 | Status: SHIPPED | OUTPATIENT
Start: 2024-04-15 | End: 2025-04-15

## 2024-04-15 RX ORDER — NYSTATIN 100000 [USP'U]/G
1 POWDER TOPICAL AS NEEDED
COMMUNITY
Start: 2024-01-22

## 2024-04-15 ASSESSMENT — PATIENT HEALTH QUESTIONNAIRE - PHQ9
2. FEELING DOWN, DEPRESSED OR HOPELESS: NOT AT ALL
1. LITTLE INTEREST OR PLEASURE IN DOING THINGS: NOT AT ALL
SUM OF ALL RESPONSES TO PHQ9 QUESTIONS 1 AND 2: 0

## 2024-04-15 ASSESSMENT — ENCOUNTER SYMPTOMS
FATIGUE: 1
LOSS OF SENSATION IN FEET: 0
NUMBNESS: 0
ABDOMINAL DISTENTION: 0
ABDOMINAL PAIN: 0
DIARRHEA: 0
VOMITING: 0
COUGH: 0
SHORTNESS OF BREATH: 0
ACTIVITY CHANGE: 0
APPETITE CHANGE: 0
SLEEP DISTURBANCE: 0
HEADACHES: 0
UNEXPECTED WEIGHT CHANGE: 0
DIZZINESS: 0
TROUBLE SWALLOWING: 0
WHEEZING: 0
BACK PAIN: 1
ARTHRALGIAS: 0
LIGHT-HEADEDNESS: 0
DEPRESSION: 0
NAUSEA: 0
DYSPHORIC MOOD: 0
CONSTIPATION: 0
PALPITATIONS: 0
RHINORRHEA: 0
NERVOUS/ANXIOUS: 0
DIFFICULTY URINATING: 0
ADENOPATHY: 0
OCCASIONAL FEELINGS OF UNSTEADINESS: 1

## 2024-04-15 ASSESSMENT — ACTIVITIES OF DAILY LIVING (ADL)
DRESSING: NEEDS ASSISTANCE
TAKING_MEDICATION: TOTAL CARE
GROCERY_SHOPPING: TOTAL CARE
MANAGING_FINANCES: TOTAL CARE
DOING_HOUSEWORK: TOTAL CARE
BATHING: NEEDS ASSISTANCE

## 2024-04-15 NOTE — PATIENT INSTRUCTIONS
Restart vitamin B12 1000 mcg once a day, also start famotidine 20 mg once twice a day for some intermittent dyspepsia, have dietary evaluation due to 20 pound weight loss in the past year.  Patient may also benefit from physical therapy to try to increase activity.  Recheck in 6 months with blood test at that office visit.

## 2024-04-15 NOTE — PROGRESS NOTES
"Subjective   Reason for Visit: Fide Dodson is an 83 y.o. female here for a Medicare Wellness visit.     Past Medical, Surgical, and Family History reviewed and updated in chart.    Reviewed all medications by prescribing practitioner or clinical pharmacist (such as prescriptions, OTCs, herbal therapies and supplements) and documented in the medical record.    HPI  No headache, chest pain, shortness of breath, dizziness, lightheadedness, or edema  Follows with surgery for breast cancer  Uses a walker at [facility], no falls, no dizziness, some pain in legs and back at times  No stomach issues, tolerating alendronate  Feels better when more active, has lost weight (20 pounds in the past year)  No dysphagia, sleeping OK at night    Patient Care Team:  Maxx Motta MD as PCP - General (Family Medicine)  Maxx Motta MD as PCP - O Medicare Advantage PCP     Review of Systems   Constitutional:  Positive for fatigue. Negative for activity change, appetite change and unexpected weight change.   HENT:  Negative for ear pain, nosebleeds, rhinorrhea, sneezing and trouble swallowing.    Respiratory:  Negative for cough, shortness of breath and wheezing.    Cardiovascular:  Negative for chest pain, palpitations and leg swelling.   Gastrointestinal:  Negative for abdominal distention, abdominal pain, constipation, diarrhea, nausea and vomiting.   Genitourinary:  Negative for difficulty urinating.   Musculoskeletal:  Positive for back pain and gait problem. Negative for arthralgias.   Skin:  Negative for rash.   Neurological:  Negative for dizziness, light-headedness, numbness and headaches.   Hematological:  Negative for adenopathy.   Psychiatric/Behavioral:  Negative for behavioral problems, dysphoric mood and sleep disturbance. The patient is not nervous/anxious.    All other systems reviewed and are negative.      Objective   Vitals:  /70   Pulse 94   Ht 1.499 m (4' 11\")   Wt 47.3 kg (104 lb 4.8 " oz)   SpO2 95%   BMI 21.07 kg/m²       Physical Exam  Vitals and nursing note reviewed.   Constitutional:       Appearance: Normal appearance.   HENT:      Head: Normocephalic and atraumatic.      Right Ear: Tympanic membrane, ear canal and external ear normal.      Left Ear: Tympanic membrane, ear canal and external ear normal.      Nose: Nose normal.      Mouth/Throat:      Mouth: Mucous membranes are moist.      Pharynx: Oropharynx is clear.   Cardiovascular:      Rate and Rhythm: Normal rate and regular rhythm.      Pulses: Normal pulses.      Heart sounds: Normal heart sounds.   Pulmonary:      Effort: Pulmonary effort is normal.      Breath sounds: Normal breath sounds.   Musculoskeletal:      Cervical back: Normal range of motion and neck supple.   Neurological:      Mental Status: She is alert.   Psychiatric:         Mood and Affect: Mood normal.         Behavior: Behavior normal.         Assessment/Plan   Problem List Items Addressed This Visit       Balance problem    Breast cancer, right breast (Multi)    Current Assessment & Plan     Follows with surgical oncology.         Fatigue    Low vitamin B12 level    Current Assessment & Plan     Levels now dropped, restart B12 at 1000 mcg once a day.         Osteoporosis    Current Assessment & Plan     Taking alendronate and vitamin D, continue with current medication.         Vitamin D deficiency    Current Assessment & Plan     Levels improved no change.          Other Visit Diagnoses       Routine general medical examination at health care facility    -  Primary    Weight loss        20 pound weight loss in the past year, nutrition dietary evaluation in assisted living.    Dyspepsia        Placed on famotidine twice daily.

## 2024-09-16 ENCOUNTER — OFFICE VISIT (OUTPATIENT)
Age: 84
End: 2024-09-16
Payer: MEDICARE

## 2024-09-16 VITALS
WEIGHT: 98.8 LBS | HEIGHT: 59 IN | BODY MASS INDEX: 19.92 KG/M2 | DIASTOLIC BLOOD PRESSURE: 76 MMHG | OXYGEN SATURATION: 96 % | HEART RATE: 100 BPM | SYSTOLIC BLOOD PRESSURE: 122 MMHG

## 2024-09-16 DIAGNOSIS — D48.9 NEOPLASM OF UNCERTAIN BEHAVIOR: Primary | ICD-10-CM

## 2024-09-16 PROCEDURE — 1159F MED LIST DOCD IN RCRD: CPT | Performed by: STUDENT IN AN ORGANIZED HEALTH CARE EDUCATION/TRAINING PROGRAM

## 2024-09-16 PROCEDURE — 99213 OFFICE O/P EST LOW 20 MIN: CPT | Performed by: STUDENT IN AN ORGANIZED HEALTH CARE EDUCATION/TRAINING PROGRAM

## 2024-09-16 PROCEDURE — 1157F ADVNC CARE PLAN IN RCRD: CPT | Performed by: STUDENT IN AN ORGANIZED HEALTH CARE EDUCATION/TRAINING PROGRAM

## 2024-09-16 PROCEDURE — 1036F TOBACCO NON-USER: CPT | Performed by: STUDENT IN AN ORGANIZED HEALTH CARE EDUCATION/TRAINING PROGRAM

## 2024-09-16 PROCEDURE — 1160F RVW MEDS BY RX/DR IN RCRD: CPT | Performed by: STUDENT IN AN ORGANIZED HEALTH CARE EDUCATION/TRAINING PROGRAM

## 2024-09-16 NOTE — PROGRESS NOTES
"Subjective:  Fide Dodson is a 84 y.o. female who presents to clinic today for spot on nose (C/o spot on left side of nose noticed it about 3 weeks ago, sometimes it feels like it itches, or throbs, it is getting larger. Red in color.)      She notes a growing lesion on the left side of her nose. She has been treating it with hydrogen peroxide and neosporin. It is not painful. Its been present for 3-4 weeks. It looks like its going ot heal and then it doesn't.     Review of Systems    Assessment/Plan:  Fide Dodson is a 84 y.o. female  who presents to clinic today to address the following issues:   1. Neoplasm of uncertain behavior  Referral to Dermatology        - Acute problem, unresolved, new to this provider, requires further workup and treatment   - recommended referral to dermatology for removal and possible MOHs based on location as well as characteristics of lesion    Problem List Items Addressed This Visit    None  Visit Diagnoses       Neoplasm of uncertain behavior    -  Primary    Relevant Orders    Referral to Dermatology            There are no Patient Instructions on file for this visit.    Follow up: as needed    Return precautions discussed.  An After Visit Summary was given to the patient.  All questions were answered and patient in agreement with plan.    Objective:  /76   Pulse 100   Ht 1.499 m (4' 11\")   Wt (!) 44.8 kg (98 lb 12.8 oz)   SpO2 96%   BMI 19.96 kg/m²     Physical Exam  Vitals and nursing note reviewed.   Constitutional:       General: She is not in acute distress.     Appearance: Normal appearance.      Comments: Frail appearing, walker in front of her   HENT:      Head: Normocephalic and atraumatic.      Mouth/Throat:      Mouth: Mucous membranes are moist.   Eyes:      General: No scleral icterus.        Right eye: No discharge.         Left eye: No discharge.      Extraocular Movements: Extraocular movements intact.      Conjunctiva/sclera: Conjunctivae " normal.   Pulmonary:      Effort: No respiratory distress.   Skin:     General: Skin is warm and dry.      Comments: Erythematous pearly and slightly ulcerated lesion on left side of nose, approximately [id number]   Neurological:      General: No focal deficit present.      Mental Status: She is alert and oriented to person, place, and time.   Psychiatric:         Attention and Perception: Attention normal.         Mood and Affect: Mood normal.         Speech: Speech normal.         Behavior: Behavior normal.         Cognition and Memory: Cognition and memory normal.         Judgment: Judgment normal.         I spent 14 minutes in total time for this visit including all related clinical activities before, during, and after the visit excluding other billable activities/procedure time.     Rebekah Garcia MD

## 2024-09-27 ENCOUNTER — HOSPITAL ENCOUNTER (OUTPATIENT)
Dept: RADIOLOGY | Facility: CLINIC | Age: 84
Discharge: HOME | End: 2024-09-27
Payer: MEDICARE

## 2024-09-27 VITALS — BODY MASS INDEX: 19.87 KG/M2 | HEIGHT: 59 IN | WEIGHT: 98.55 LBS

## 2024-09-27 DIAGNOSIS — Z12.31 ENCOUNTER FOR SCREENING MAMMOGRAM FOR MALIGNANT NEOPLASM OF BREAST: ICD-10-CM

## 2024-09-27 PROCEDURE — 77067 SCR MAMMO BI INCL CAD: CPT

## 2024-10-11 ENCOUNTER — LAB REQUISITION (OUTPATIENT)
Dept: LAB | Facility: HOSPITAL | Age: 84
End: 2024-10-11
Payer: MEDICARE

## 2024-10-11 DIAGNOSIS — D51.9 VITAMIN B12 DEFICIENCY ANEMIA, UNSPECIFIED: ICD-10-CM

## 2024-10-11 DIAGNOSIS — N17.9 ACUTE KIDNEY FAILURE, UNSPECIFIED (CMS-HCC): ICD-10-CM

## 2024-10-11 LAB
ALBUMIN SERPL BCP-MCNC: 4.5 G/DL (ref 3.4–5)
ALP SERPL-CCNC: 49 U/L (ref 33–136)
ALT SERPL W P-5'-P-CCNC: 9 U/L (ref 7–45)
ANION GAP SERPL CALC-SCNC: 14 MMOL/L (ref 10–20)
AST SERPL W P-5'-P-CCNC: 20 U/L (ref 9–39)
BASOPHILS # BLD AUTO: 0.05 X10*3/UL (ref 0–0.1)
BASOPHILS NFR BLD AUTO: 0.6 %
BILIRUB SERPL-MCNC: 0.7 MG/DL (ref 0–1.2)
BUN SERPL-MCNC: 20 MG/DL (ref 6–23)
CALCIUM SERPL-MCNC: 9.4 MG/DL (ref 8.6–10.3)
CHLORIDE SERPL-SCNC: 106 MMOL/L (ref 98–107)
CO2 SERPL-SCNC: 27 MMOL/L (ref 21–32)
CREAT SERPL-MCNC: 0.91 MG/DL (ref 0.5–1.05)
EGFRCR SERPLBLD CKD-EPI 2021: 62 ML/MIN/1.73M*2
EOSINOPHIL # BLD AUTO: 0.18 X10*3/UL (ref 0–0.4)
EOSINOPHIL NFR BLD AUTO: 2.1 %
ERYTHROCYTE [DISTWIDTH] IN BLOOD BY AUTOMATED COUNT: 13 % (ref 11.5–14.5)
GLUCOSE SERPL-MCNC: 82 MG/DL (ref 74–99)
HCT VFR BLD AUTO: 43.8 % (ref 36–46)
HGB BLD-MCNC: 14.3 G/DL (ref 12–16)
IMM GRANULOCYTES # BLD AUTO: 0.02 X10*3/UL (ref 0–0.5)
IMM GRANULOCYTES NFR BLD AUTO: 0.2 % (ref 0–0.9)
LYMPHOCYTES # BLD AUTO: 1.3 X10*3/UL (ref 0.8–3)
LYMPHOCYTES NFR BLD AUTO: 15.3 %
MCH RBC QN AUTO: 33.5 PG (ref 26–34)
MCHC RBC AUTO-ENTMCNC: 32.6 G/DL (ref 32–36)
MCV RBC AUTO: 103 FL (ref 80–100)
MONOCYTES # BLD AUTO: 0.68 X10*3/UL (ref 0.05–0.8)
MONOCYTES NFR BLD AUTO: 8 %
NEUTROPHILS # BLD AUTO: 6.26 X10*3/UL (ref 1.6–5.5)
NEUTROPHILS NFR BLD AUTO: 73.8 %
NRBC BLD-RTO: 0 /100 WBCS (ref 0–0)
PLATELET # BLD AUTO: 237 X10*3/UL (ref 150–450)
POTASSIUM SERPL-SCNC: 4.6 MMOL/L (ref 3.5–5.3)
PROT SERPL-MCNC: 6.9 G/DL (ref 6.4–8.2)
RBC # BLD AUTO: 4.27 X10*6/UL (ref 4–5.2)
SODIUM SERPL-SCNC: 142 MMOL/L (ref 136–145)
TSH SERPL-ACNC: 1.37 MIU/L (ref 0.44–3.98)
VIT B12 SERPL-MCNC: 1293 PG/ML (ref 211–911)
WBC # BLD AUTO: 8.5 X10*3/UL (ref 4.4–11.3)

## 2024-10-11 PROCEDURE — 36415 COLL VENOUS BLD VENIPUNCTURE: CPT | Mod: OUT | Performed by: FAMILY MEDICINE

## 2024-10-11 PROCEDURE — 84443 ASSAY THYROID STIM HORMONE: CPT | Mod: OUT | Performed by: FAMILY MEDICINE

## 2024-10-11 PROCEDURE — 85025 COMPLETE CBC W/AUTO DIFF WBC: CPT | Mod: OUT | Performed by: FAMILY MEDICINE

## 2024-10-11 PROCEDURE — 84075 ASSAY ALKALINE PHOSPHATASE: CPT | Mod: OUT | Performed by: FAMILY MEDICINE

## 2024-10-11 PROCEDURE — 82607 VITAMIN B-12: CPT | Mod: OUT | Performed by: FAMILY MEDICINE

## 2024-10-15 ENCOUNTER — APPOINTMENT (OUTPATIENT)
Dept: PRIMARY CARE | Facility: CLINIC | Age: 84
End: 2024-10-15
Payer: MEDICARE

## 2024-10-15 VITALS
WEIGHT: 102 LBS | BODY MASS INDEX: 20.56 KG/M2 | OXYGEN SATURATION: 96 % | HEART RATE: 96 BPM | HEIGHT: 59 IN | DIASTOLIC BLOOD PRESSURE: 70 MMHG | SYSTOLIC BLOOD PRESSURE: 130 MMHG

## 2024-10-15 DIAGNOSIS — C50.011 MALIGNANT NEOPLASM OF NIPPLE OF RIGHT BREAST IN FEMALE, ESTROGEN RECEPTOR POSITIVE: ICD-10-CM

## 2024-10-15 DIAGNOSIS — R79.89 LOW VITAMIN B12 LEVEL: ICD-10-CM

## 2024-10-15 DIAGNOSIS — E55.9 VITAMIN D DEFICIENCY: ICD-10-CM

## 2024-10-15 DIAGNOSIS — R26.89 BALANCE PROBLEM: ICD-10-CM

## 2024-10-15 DIAGNOSIS — E46 PROTEIN-CALORIE MALNUTRITION, UNSPECIFIED SEVERITY (MULTI): Primary | ICD-10-CM

## 2024-10-15 DIAGNOSIS — M81.0 OSTEOPOROSIS WITHOUT CURRENT PATHOLOGICAL FRACTURE, UNSPECIFIED OSTEOPOROSIS TYPE: ICD-10-CM

## 2024-10-15 DIAGNOSIS — Z17.0 MALIGNANT NEOPLASM OF NIPPLE OF RIGHT BREAST IN FEMALE, ESTROGEN RECEPTOR POSITIVE: ICD-10-CM

## 2024-10-15 DIAGNOSIS — R53.83 FATIGUE, UNSPECIFIED TYPE: ICD-10-CM

## 2024-10-15 PROCEDURE — 1159F MED LIST DOCD IN RCRD: CPT | Performed by: FAMILY MEDICINE

## 2024-10-15 PROCEDURE — 1157F ADVNC CARE PLAN IN RCRD: CPT | Performed by: FAMILY MEDICINE

## 2024-10-15 PROCEDURE — 99214 OFFICE O/P EST MOD 30 MIN: CPT | Performed by: FAMILY MEDICINE

## 2024-10-15 PROCEDURE — 1036F TOBACCO NON-USER: CPT | Performed by: FAMILY MEDICINE

## 2024-10-15 PROCEDURE — 1160F RVW MEDS BY RX/DR IN RCRD: CPT | Performed by: FAMILY MEDICINE

## 2024-10-15 RX ORDER — LANOLIN ALCOHOL/MO/W.PET/CERES
1000 CREAM (GRAM) TOPICAL EVERY OTHER DAY
Qty: 45 TABLET | Refills: 3 | Status: SHIPPED | OUTPATIENT
Start: 2024-10-15 | End: 2025-10-15

## 2024-10-15 ASSESSMENT — ENCOUNTER SYMPTOMS
APPETITE CHANGE: 0
DIARRHEA: 0
CONSTIPATION: 0
SHORTNESS OF BREATH: 0
ABDOMINAL DISTENTION: 0
DYSPHORIC MOOD: 0
ADENOPATHY: 0
NAUSEA: 0
NUMBNESS: 0
ABDOMINAL PAIN: 0
VOMITING: 0
COUGH: 0
SLEEP DISTURBANCE: 0
HEADACHES: 0
RHINORRHEA: 0
UNEXPECTED WEIGHT CHANGE: 0
DIZZINESS: 0
NERVOUS/ANXIOUS: 0
PALPITATIONS: 0
LIGHT-HEADEDNESS: 0
FATIGUE: 0
ARTHRALGIAS: 0
WHEEZING: 0
ACTIVITY CHANGE: 0
DIFFICULTY URINATING: 0
TROUBLE SWALLOWING: 0

## 2024-10-15 NOTE — ASSESSMENT & PLAN NOTE
Seen surgery at [facility] in the past couple months, mammogram stable, no change.

## 2024-10-15 NOTE — ASSESSMENT & PLAN NOTE
15 pound weight loss in the past year, BMI 20, encouraged about diet, patient complains about the food at her assisted living facility.

## 2024-10-15 NOTE — PROGRESS NOTES
"Subjective   Patient ID: Fide Dodson is a 84 y.o. female who presents for 6 MO LABS.    HPI   Resident at [facility]  No headache, chest pain, shortness of breath, dizziness, lightheadedness, or edema  Had skin surgery for cancer last week  Appetite +/-, does not like the food  No stomach complaints  No incont of stool or urine  Some aches and pains, uses a walker, no falls since last seen  15 pound weight loss in the past year    Review of Systems   Constitutional:  Negative for activity change, appetite change, fatigue and unexpected weight change.   HENT:  Negative for ear pain, nosebleeds, rhinorrhea, sneezing and trouble swallowing.    Respiratory:  Negative for cough, shortness of breath and wheezing.    Cardiovascular:  Negative for chest pain, palpitations and leg swelling.   Gastrointestinal:  Negative for abdominal distention, abdominal pain, constipation, diarrhea, nausea and vomiting.   Genitourinary:  Negative for difficulty urinating.   Musculoskeletal:  Positive for gait problem. Negative for arthralgias.   Skin:  Negative for rash.   Neurological:  Negative for dizziness, light-headedness, numbness and headaches.   Hematological:  Negative for adenopathy.   Psychiatric/Behavioral:  Negative for behavioral problems, dysphoric mood and sleep disturbance. The patient is not nervous/anxious.    All other systems reviewed and are negative.      Objective   /70   Pulse 96   Ht 1.499 m (4' 11\")   Wt 46.3 kg (102 lb)   SpO2 96%   BMI 20.60 kg/m²     Physical Exam  Vitals and nursing note reviewed.   Constitutional:       Appearance: Normal appearance.   HENT:      Head: Normocephalic and atraumatic.      Right Ear: Tympanic membrane, ear canal and external ear normal.      Left Ear: Tympanic membrane, ear canal and external ear normal.      Nose: Nose normal.      Mouth/Throat:      Mouth: Mucous membranes are moist.      Pharynx: Oropharynx is clear.   Cardiovascular:      Rate and Rhythm: " Normal rate and regular rhythm.      Pulses: Normal pulses.      Heart sounds: Normal heart sounds.   Pulmonary:      Effort: Pulmonary effort is normal.      Breath sounds: Normal breath sounds.   Musculoskeletal:      Cervical back: Normal range of motion and neck supple.   Neurological:      Mental Status: She is alert.   Psychiatric:         Mood and Affect: Mood normal.         Behavior: Behavior normal.         Assessment/Plan   Problem List Items Addressed This Visit             ICD-10-CM    Balance problem R26.89    Breast cancer, right breast (Multi) C50.911     Seen surgery at [facility] in the past couple months, mammogram stable, no change.         Fatigue R53.83    Low vitamin B12 level R79.89     Vitamin B12 level is actually elevated, reduce oral dosing to every other day.         Relevant Medications    cyanocobalamin (Vitamin B-12) 1,000 mcg tablet    Osteoporosis M81.0     Tolerating bisphosphonate will be due for bone density test next year.  No GI issues.         Vitamin D deficiency E55.9    Protein-calorie malnutrition, unspecified severity (Multi) - Primary E46     15 pound weight loss in the past year, BMI 20, encouraged about diet, patient complains about the food at her assisted living facility.             Patient was identified as a fall risk. Risk prevention instructions provided.

## 2025-04-09 ENCOUNTER — LAB REQUISITION (OUTPATIENT)
Dept: LAB | Facility: HOSPITAL | Age: 85
End: 2025-04-09
Payer: MEDICARE

## 2025-04-09 DIAGNOSIS — N17.9 ACUTE KIDNEY FAILURE, UNSPECIFIED: ICD-10-CM

## 2025-04-09 DIAGNOSIS — M81.0 AGE-RELATED OSTEOPOROSIS WITHOUT CURRENT PATHOLOGICAL FRACTURE: ICD-10-CM

## 2025-04-09 DIAGNOSIS — D51.9 VITAMIN B12 DEFICIENCY ANEMIA, UNSPECIFIED: ICD-10-CM

## 2025-04-09 LAB
ALBUMIN SERPL BCP-MCNC: 4.2 G/DL (ref 3.4–5)
ALP SERPL-CCNC: 56 U/L (ref 33–136)
ALT SERPL W P-5'-P-CCNC: 9 U/L (ref 7–45)
ANION GAP SERPL CALC-SCNC: 11 MMOL/L (ref 10–20)
AST SERPL W P-5'-P-CCNC: 18 U/L (ref 9–39)
BASOPHILS # BLD AUTO: 0.03 X10*3/UL (ref 0–0.1)
BASOPHILS NFR BLD AUTO: 0.5 %
BILIRUB SERPL-MCNC: 0.7 MG/DL (ref 0–1.2)
BUN SERPL-MCNC: 18 MG/DL (ref 6–23)
CALCIUM SERPL-MCNC: 9 MG/DL (ref 8.6–10.3)
CHLORIDE SERPL-SCNC: 103 MMOL/L (ref 98–107)
CO2 SERPL-SCNC: 30 MMOL/L (ref 21–32)
CREAT SERPL-MCNC: 0.8 MG/DL (ref 0.5–1.05)
EGFRCR SERPLBLD CKD-EPI 2021: 72 ML/MIN/1.73M*2
EOSINOPHIL # BLD AUTO: 0.19 X10*3/UL (ref 0–0.4)
EOSINOPHIL NFR BLD AUTO: 3 %
ERYTHROCYTE [DISTWIDTH] IN BLOOD BY AUTOMATED COUNT: 12.9 % (ref 11.5–14.5)
GLUCOSE SERPL-MCNC: 92 MG/DL (ref 74–99)
HCT VFR BLD AUTO: 38.5 % (ref 36–46)
HGB BLD-MCNC: 12.6 G/DL (ref 12–16)
IMM GRANULOCYTES # BLD AUTO: 0.01 X10*3/UL (ref 0–0.5)
IMM GRANULOCYTES NFR BLD AUTO: 0.2 % (ref 0–0.9)
LYMPHOCYTES # BLD AUTO: 0.85 X10*3/UL (ref 0.8–3)
LYMPHOCYTES NFR BLD AUTO: 13.6 %
MCH RBC QN AUTO: 33.7 PG (ref 26–34)
MCHC RBC AUTO-ENTMCNC: 32.7 G/DL (ref 32–36)
MCV RBC AUTO: 103 FL (ref 80–100)
MONOCYTES # BLD AUTO: 0.66 X10*3/UL (ref 0.05–0.8)
MONOCYTES NFR BLD AUTO: 10.6 %
NEUTROPHILS # BLD AUTO: 4.5 X10*3/UL (ref 1.6–5.5)
NEUTROPHILS NFR BLD AUTO: 72.1 %
NRBC BLD-RTO: 0 /100 WBCS (ref 0–0)
PLATELET # BLD AUTO: 188 X10*3/UL (ref 150–450)
POTASSIUM SERPL-SCNC: 4.1 MMOL/L (ref 3.5–5.3)
PROT SERPL-MCNC: 6.6 G/DL (ref 6.4–8.2)
RBC # BLD AUTO: 3.74 X10*6/UL (ref 4–5.2)
SODIUM SERPL-SCNC: 140 MMOL/L (ref 136–145)
VIT B12 SERPL-MCNC: 964 PG/ML (ref 211–911)
WBC # BLD AUTO: 6.2 X10*3/UL (ref 4.4–11.3)

## 2025-04-09 PROCEDURE — 80053 COMPREHEN METABOLIC PANEL: CPT | Mod: OUT | Performed by: FAMILY MEDICINE

## 2025-04-09 PROCEDURE — 82607 VITAMIN B-12: CPT | Mod: OUT,SAMLAB | Performed by: FAMILY MEDICINE

## 2025-04-09 PROCEDURE — 85025 COMPLETE CBC W/AUTO DIFF WBC: CPT | Mod: OUT | Performed by: FAMILY MEDICINE

## 2025-04-09 PROCEDURE — 36415 COLL VENOUS BLD VENIPUNCTURE: CPT | Mod: OUT | Performed by: FAMILY MEDICINE

## 2025-04-15 ENCOUNTER — APPOINTMENT (OUTPATIENT)
Age: 85
End: 2025-04-15

## 2025-04-15 VITALS
SYSTOLIC BLOOD PRESSURE: 140 MMHG | BODY MASS INDEX: 20.16 KG/M2 | HEIGHT: 59 IN | HEART RATE: 95 BPM | OXYGEN SATURATION: 97 % | DIASTOLIC BLOOD PRESSURE: 70 MMHG | WEIGHT: 100 LBS

## 2025-04-15 DIAGNOSIS — R26.89 BALANCE PROBLEM: ICD-10-CM

## 2025-04-15 DIAGNOSIS — M81.0 AGE RELATED OSTEOPOROSIS, UNSPECIFIED PATHOLOGICAL FRACTURE PRESENCE: ICD-10-CM

## 2025-04-15 DIAGNOSIS — C50.011 MALIGNANT NEOPLASM OF NIPPLE OF RIGHT BREAST IN FEMALE, ESTROGEN RECEPTOR POSITIVE: ICD-10-CM

## 2025-04-15 DIAGNOSIS — M79.671 RIGHT FOOT PAIN: ICD-10-CM

## 2025-04-15 DIAGNOSIS — Z17.0 MALIGNANT NEOPLASM OF NIPPLE OF RIGHT BREAST IN FEMALE, ESTROGEN RECEPTOR POSITIVE: ICD-10-CM

## 2025-04-15 DIAGNOSIS — Z00.00 ROUTINE GENERAL MEDICAL EXAMINATION AT HEALTH CARE FACILITY: Primary | ICD-10-CM

## 2025-04-15 DIAGNOSIS — E46 PROTEIN-CALORIE MALNUTRITION, UNSPECIFIED SEVERITY (MULTI): ICD-10-CM

## 2025-04-15 PROCEDURE — 1170F FXNL STATUS ASSESSED: CPT | Performed by: FAMILY MEDICINE

## 2025-04-15 PROCEDURE — 1123F ACP DISCUSS/DSCN MKR DOCD: CPT | Performed by: FAMILY MEDICINE

## 2025-04-15 PROCEDURE — 1159F MED LIST DOCD IN RCRD: CPT | Performed by: FAMILY MEDICINE

## 2025-04-15 PROCEDURE — 99214 OFFICE O/P EST MOD 30 MIN: CPT | Performed by: FAMILY MEDICINE

## 2025-04-15 PROCEDURE — 1036F TOBACCO NON-USER: CPT | Performed by: FAMILY MEDICINE

## 2025-04-15 PROCEDURE — 1157F ADVNC CARE PLAN IN RCRD: CPT | Performed by: FAMILY MEDICINE

## 2025-04-15 PROCEDURE — G0439 PPPS, SUBSEQ VISIT: HCPCS | Performed by: FAMILY MEDICINE

## 2025-04-15 PROCEDURE — 1158F ADVNC CARE PLAN TLK DOCD: CPT | Performed by: FAMILY MEDICINE

## 2025-04-15 ASSESSMENT — ENCOUNTER SYMPTOMS
OCCASIONAL FEELINGS OF UNSTEADINESS: 1
RHINORRHEA: 0
NAUSEA: 0
NERVOUS/ANXIOUS: 0
CONSTIPATION: 0
ABDOMINAL DISTENTION: 0
DEPRESSION: 0
APPETITE CHANGE: 0
HEADACHES: 0
DYSPHORIC MOOD: 0
DIZZINESS: 0
COUGH: 0
ARTHRALGIAS: 1
WHEEZING: 0
LOSS OF SENSATION IN FEET: 0
ABDOMINAL PAIN: 0
VOMITING: 0
PALPITATIONS: 0
UNEXPECTED WEIGHT CHANGE: 0
SHORTNESS OF BREATH: 0
SLEEP DISTURBANCE: 0
ACTIVITY CHANGE: 0
ADENOPATHY: 0
NUMBNESS: 0
DIFFICULTY URINATING: 0
FATIGUE: 1
LIGHT-HEADEDNESS: 0
DIARRHEA: 0
TROUBLE SWALLOWING: 0

## 2025-04-15 ASSESSMENT — PATIENT HEALTH QUESTIONNAIRE - PHQ9
SUM OF ALL RESPONSES TO PHQ9 QUESTIONS 1 AND 2: 0
2. FEELING DOWN, DEPRESSED OR HOPELESS: NOT AT ALL
1. LITTLE INTEREST OR PLEASURE IN DOING THINGS: NOT AT ALL

## 2025-04-15 ASSESSMENT — ACTIVITIES OF DAILY LIVING (ADL)
GROCERY_SHOPPING: TOTAL CARE
DOING_HOUSEWORK: TOTAL CARE
MANAGING_FINANCES: TOTAL CARE
DRESSING: DEPENDENT
TAKING_MEDICATION: TOTAL CARE
BATHING: DEPENDENT

## 2025-04-15 NOTE — ASSESSMENT & PLAN NOTE
Uses a walker and wheelchair, no recent falls.  Orders:    Follow Up In Primary Care - Established; Future

## 2025-04-15 NOTE — ASSESSMENT & PLAN NOTE
Has follow-up annually, mammograms are stable.  Orders:    Follow Up In Primary Care - Established; Future

## 2025-04-15 NOTE — ASSESSMENT & PLAN NOTE
Weight has stabilized since last office visit, laboratory testing shows stable renal and liver function testing and blood sugar.  Patient encouraged about diet.  Orders:    CBC and Auto Differential; Future    Comprehensive Metabolic Panel; Future    Follow Up In Primary Care - Established; Future

## 2025-04-15 NOTE — ASSESSMENT & PLAN NOTE
Patient refusing to take alendronate, last bone density testing showed osteoporosis, patient high risk for insufficiency fractures.  Orders:    Follow Up In Primary Care - Established; Future

## 2025-04-15 NOTE — PROGRESS NOTES
"Subjective   Reason for Visit: Fide Dodson is an 84 y.o. female here for a Medicare Wellness visit.     Past Medical, Surgical, and Family History reviewed and updated in chart.    Reviewed all medications by prescribing practitioner or clinical pharmacist (such as prescriptions, OTCs, herbal therapies and supplements) and documented in the medical record.    HPI  No headache, chest pain, shortness of breath, dizziness, lightheadedness, or edema  Not taking alendronate anymore, patient had been refusing to take  Weight stable from prior OV  2 weeks with pain in right foot, hard to walk with pain, no trauma, using APAP as needed, no falls, using a walker  No dysphagia, no nausea, no bowel or bladder issues      Patient Care Team:  Maxx Motta MD as PCP - General (Family Medicine)  René Mckay MD as PCP - O Medicare Advantage PCP     Review of Systems   Constitutional:  Positive for fatigue. Negative for activity change, appetite change and unexpected weight change.   HENT:  Negative for ear pain, nosebleeds, rhinorrhea, sneezing and trouble swallowing.    Respiratory:  Negative for cough, shortness of breath and wheezing.    Cardiovascular:  Negative for chest pain, palpitations and leg swelling.   Gastrointestinal:  Negative for abdominal distention, abdominal pain, constipation, diarrhea, nausea and vomiting.   Genitourinary:  Negative for difficulty urinating.   Musculoskeletal:  Positive for arthralgias and gait problem.   Skin:  Negative for rash.   Neurological:  Negative for dizziness, light-headedness, numbness and headaches.   Hematological:  Negative for adenopathy.   Psychiatric/Behavioral:  Negative for behavioral problems, dysphoric mood and sleep disturbance. The patient is not nervous/anxious.    All other systems reviewed and are negative.      Objective   Vitals:  /70   Pulse 95   Ht 1.499 m (4' 11\")   Wt 45.4 kg (100 lb)   SpO2 97%   BMI 20.20 kg/m²       Physical " Exam  Vitals and nursing note reviewed.   Constitutional:       Appearance: Normal appearance.   HENT:      Head: Normocephalic and atraumatic.      Right Ear: Tympanic membrane, ear canal and external ear normal.      Left Ear: Tympanic membrane, ear canal and external ear normal.      Nose: Nose normal.      Mouth/Throat:      Mouth: Mucous membranes are moist.      Pharynx: Oropharynx is clear.   Cardiovascular:      Rate and Rhythm: Normal rate and regular rhythm.      Pulses: Normal pulses.      Heart sounds: Normal heart sounds.   Pulmonary:      Effort: Pulmonary effort is normal.      Breath sounds: Normal breath sounds.   Musculoskeletal:      Cervical back: Normal range of motion and neck supple.      Comments: Right foot examination reveals no evidence of swelling, no tenderness to palpation over the fifth metatarsal, negative anterior drawer, negative talar tilt testing, distal pulses are grossly intact with normal capillary refill, no skin breakdown, no pain with compression of the distal foot, no pain with rotation of the midfoot.   Skin:     General: Skin is warm and dry.      Capillary Refill: Capillary refill takes less than 2 seconds.   Neurological:      Mental Status: She is alert.   Psychiatric:         Mood and Affect: Mood normal.         Behavior: Behavior normal.         Assessment & Plan  Routine general medical examination at health care facility  Discussed with patient and patient's daughter about advanced directives, after discussion about different levels of resuscitation code elected to go with DNR Comfort Care arrest.  Appropriate papers filled out and signed today in the office       Protein-calorie malnutrition, unspecified severity (Multi)  Weight has stabilized since last office visit, laboratory testing shows stable renal and liver function testing and blood sugar.  Patient encouraged about diet.  Orders:    CBC and Auto Differential; Future    Comprehensive Metabolic Panel;  Future    Follow Up In Primary Care - Established; Future    Age related osteoporosis, unspecified pathological fracture presence  Patient refusing to take alendronate, last bone density testing showed osteoporosis, patient high risk for insufficiency fractures.  Orders:    Follow Up In Primary Care - Established; Future    Malignant neoplasm of nipple of right breast in female, estrogen receptor positive  Has follow-up annually, mammograms are stable.  Orders:    Follow Up In Primary Care - Established; Future    Balance problem  Uses a walker and wheelchair, no recent falls.  Orders:    Follow Up In Primary Care - Established; Future    Right foot pain  Check x-ray and have podiatry evaluation.  Orders:    XR foot right 1-2 views; Future    Referral to Podiatry; Future    Follow Up In Primary Care - Established; Future

## 2025-04-21 ENCOUNTER — HOSPITAL ENCOUNTER (EMERGENCY)
Dept: CARDIOLOGY | Facility: HOSPITAL | Age: 85
Discharge: HOME | End: 2025-04-21
Payer: MEDICARE

## 2025-04-21 ENCOUNTER — APPOINTMENT (OUTPATIENT)
Dept: RADIOLOGY | Facility: HOSPITAL | Age: 85
End: 2025-04-21
Payer: MEDICARE

## 2025-04-21 ENCOUNTER — HOSPITAL ENCOUNTER (EMERGENCY)
Facility: HOSPITAL | Age: 85
Discharge: HOME | End: 2025-04-22
Attending: EMERGENCY MEDICINE
Payer: MEDICARE

## 2025-04-21 DIAGNOSIS — S09.90XA CLOSED HEAD INJURY, INITIAL ENCOUNTER: ICD-10-CM

## 2025-04-21 DIAGNOSIS — S22.030D: ICD-10-CM
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
## 2025-04-21 PROCEDURE — 70450 CT HEAD/BRAIN W/O DYE: CPT

## 2025-04-21 PROCEDURE — 72125 CT NECK SPINE W/O DYE: CPT

## 2025-04-21 PROCEDURE — 99284 EMERGENCY DEPT VISIT MOD MDM: CPT | Mod: 25 | Performed by: EMERGENCY MEDICINE

## 2025-04-21 PROCEDURE — 93005 ELECTROCARDIOGRAM TRACING: CPT

## 2025-04-21 ASSESSMENT — COLUMBIA-SUICIDE SEVERITY RATING SCALE - C-SSRS
1. IN THE PAST MONTH, HAVE YOU WISHED YOU WERE DEAD OR WISHED YOU COULD GO TO SLEEP AND NOT WAKE UP?: NO
2. HAVE YOU ACTUALLY HAD ANY THOUGHTS OF KILLING YOURSELF?: NO
6. HAVE YOU EVER DONE ANYTHING, STARTED TO DO ANYTHING, OR PREPARED TO DO ANYTHING TO END YOUR LIFE?: NO

## 2025-04-21 ASSESSMENT — PAIN - FUNCTIONAL ASSESSMENT: PAIN_FUNCTIONAL_ASSESSMENT: 0-10

## 2025-04-21 ASSESSMENT — PAIN DESCRIPTION - DESCRIPTORS: DESCRIPTORS: PRESSURE

## 2025-04-21 ASSESSMENT — PAIN SCALES - GENERAL: PAINLEVEL_OUTOF10: 5 - MODERATE PAIN

## 2025-04-21 ASSESSMENT — PAIN DESCRIPTION - FREQUENCY: FREQUENCY: CONSTANT/CONTINUOUS

## 2025-04-21 ASSESSMENT — PAIN DESCRIPTION - LOCATION: LOCATION: HEAD

## 2025-04-21 ASSESSMENT — PAIN DESCRIPTION - PROGRESSION: CLINICAL_PROGRESSION: NOT CHANGED

## 2025-04-21 ASSESSMENT — PAIN DESCRIPTION - ONSET: ONSET: SUDDEN

## 2025-04-21 ASSESSMENT — PAIN DESCRIPTION - PAIN TYPE: TYPE: ACUTE PAIN

## 2025-04-22 VITALS
RESPIRATION RATE: 18 BRPM | OXYGEN SATURATION: 96 % | WEIGHT: 100 LBS | DIASTOLIC BLOOD PRESSURE: 75 MMHG | BODY MASS INDEX: 21.57 KG/M2 | HEART RATE: 82 BPM | SYSTOLIC BLOOD PRESSURE: 134 MMHG | TEMPERATURE: 97.9 F | HEIGHT: 57 IN

## 2025-04-22 LAB
ATRIAL RATE: 92 BPM
P AXIS: 50 DEGREES
P OFFSET: 202 MS
P ONSET: 147 MS
PR INTERVAL: 150 MS
Q ONSET: 222 MS
QRS COUNT: 16 BEATS
QRS DURATION: 66 MS
QT INTERVAL: 330 MS
QTC CALCULATION(BAZETT): 408 MS
QTC FREDERICIA: 380 MS
R AXIS: 24 DEGREES
T AXIS: -2 DEGREES
T OFFSET: 387 MS
VENTRICULAR RATE: 92 BPM

## 2025-04-22 PROCEDURE — 70450 CT HEAD/BRAIN W/O DYE: CPT | Performed by: RADIOLOGY

## 2025-04-22 PROCEDURE — 72125 CT NECK SPINE W/O DYE: CPT | Performed by: RADIOLOGY

## 2025-04-22 ASSESSMENT — VISUAL ACUITY: OU: 1

## 2025-04-22 NOTE — DISCHARGE INSTRUCTIONS
CT scan of the cervical spine reveals a chronic stable compression deformity of the T3 vertebral body.  No acute fractures noted.

## 2025-04-22 NOTE — ED PROVIDER NOTES
Fall with head injury.  This 84-year-old white female presents to the ED via EMS from a skilled nursing facility secondary to a fall that occurred earlier today at 11:30 AM patient states that she was pushing her chair and at lunchtime when she lost her balance and fell backwards striking the back of her head on the ground she denies any loss of consciousness.  She states that she did develop frontal headache symptoms after this fall and has been experiencing them since then she denies any other injuries from this fall.  She states that she initially refused to come to the ED but because of continued headache symptoms decided to come in to get checked out.  Denies any chest pain or shortness of breath.      History provided by:  Patient and EMS personnel   used: No         Physical Exam  Vitals and nursing note reviewed.   Constitutional:       Appearance: Normal appearance. She is normal weight.   HENT:      Head: Normocephalic.      Right Ear: Tympanic membrane, ear canal and external ear normal. Decreased hearing noted.      Left Ear: Tympanic membrane, ear canal and external ear normal. Decreased hearing noted.      Nose: Nose normal. No congestion or rhinorrhea.      Mouth/Throat:      Lips: Pink.      Mouth: Mucous membranes are moist.      Pharynx: Oropharynx is clear. Uvula midline. No oropharyngeal exudate or posterior oropharyngeal erythema.   Eyes:      General: Lids are normal. Vision grossly intact.         Right eye: No discharge.         Left eye: No discharge.      Extraocular Movements: Extraocular movements intact.      Conjunctiva/sclera: Conjunctivae normal.      Pupils: Pupils are equal, round, and reactive to light.   Cardiovascular:      Rate and Rhythm: Normal rate and regular rhythm.      Pulses: Normal pulses.      Heart sounds: Normal heart sounds. No murmur heard.     No friction rub. No gallop.   Pulmonary:      Effort: Pulmonary effort is normal. No respiratory  distress.      Breath sounds: Normal breath sounds. No stridor. No wheezing, rhonchi or rales.   Chest:      Chest wall: No tenderness.   Abdominal:      General: Abdomen is flat. Bowel sounds are normal. There is no distension.      Palpations: Abdomen is soft. There is no mass.      Tenderness: There is no abdominal tenderness. There is no guarding or rebound.      Hernia: No hernia is present.      Comments: Has a benign abdominal exam.   Musculoskeletal:         General: No swelling, tenderness, deformity or signs of injury. Normal range of motion.      Cervical back: Full passive range of motion without pain, normal range of motion and neck supple.      Right lower leg: Normal. No edema.      Left lower leg: Normal. No edema.   Skin:     General: Skin is warm and dry.      Capillary Refill: Capillary refill takes less than 2 seconds.      Coloration: Skin is not jaundiced or pale.      Findings: No bruising, erythema, lesion or rash.   Neurological:      General: No focal deficit present.      Mental Status: She is alert and oriented to person, place, and time.      Cranial Nerves: No cranial nerve deficit.      Sensory: No sensory deficit.      Motor: No weakness.      Coordination: Coordination normal.      Deep Tendon Reflexes: Reflexes normal.   Psychiatric:         Attention and Perception: Attention and perception normal.         Mood and Affect: Mood and affect normal.         Speech: Speech normal.         Behavior: Behavior normal. Behavior is cooperative.         Thought Content: Thought content normal.         Judgment: Judgment normal.          Labs Reviewed - No data to display     CT head wo IV contrast   Final Result   No acute intracranial abnormality.        Chronic changes as noted above.        MACRO:   None        Signed by: Tacho Fraser 4/22/2025 12:41 AM   Dictation workstation:   [id number]      CT cervical spine wo IV contrast   Final Result   No acute fracture or traumatic  subluxation of the cervical spine.        Multilevel discogenic degenerative changes as noted above.        Stable chronic compression deformity of the T3 vertebral body.        MACRO:   None        Signed by: Tacho Fraser 4/22/2025 12:46 AM   Dictation workstation:   [id number]           Procedures     Medical Decision Making  Patient was seen and evaluated after obtaining a head injury from a mechanical fall in which the patient fell backwards.  Patient has CT scan imaging of the brain and C-spine performed these were negative for acute fracture or acute abnormality.  I did have a discussion with the patient concerning her CT scan results prior to discharging her back to the skilled nursing facility she resides.  She was noted to have a compression fracture in the thoracic spine that was chronic.    Amount and/or Complexity of Data Reviewed  ECG/medicine tests: independent interpretation performed.     Details: EKG was interpreted by myself at 10:57 PM reveals normal sinus rhythm with flipped T's and lead III and aVF.  Is also flipped T waves in lead V1.  V1.  Heart rate is 92 bpm.  The UT interval was 150 ms.  The QRS duration 66 ms the QTc is 408 ms axis is 24 degrees.         Diagnoses as of 04/23/25 0729   Fall, initial encounter   Closed head injury, initial encounter   Closed wedge compression fracture of third thoracic vertebra with routine healing                    Juan Siegel, DO  04/23/25 0729

## 2025-10-16 ENCOUNTER — APPOINTMENT (OUTPATIENT)
Age: 85
End: 2025-10-16
Payer: MEDICARE